# Patient Record
Sex: FEMALE | Race: AMERICAN INDIAN OR ALASKA NATIVE
[De-identification: names, ages, dates, MRNs, and addresses within clinical notes are randomized per-mention and may not be internally consistent; named-entity substitution may affect disease eponyms.]

---

## 2018-05-01 ENCOUNTER — HOSPITAL ENCOUNTER (OUTPATIENT)
Dept: HOSPITAL 5 - TRG | Age: 23
Discharge: HOME | End: 2018-05-01
Attending: OBSTETRICS & GYNECOLOGY
Payer: COMMERCIAL

## 2018-05-01 VITALS — DIASTOLIC BLOOD PRESSURE: 60 MMHG | SYSTOLIC BLOOD PRESSURE: 108 MMHG

## 2018-05-01 DIAGNOSIS — Z3A.33: ICD-10-CM

## 2018-05-01 DIAGNOSIS — O47.03: Primary | ICD-10-CM

## 2018-05-01 LAB
BILIRUB UR QL STRIP: (no result)
BLOOD UR QL VISUAL: (no result)
MUCOUS THREADS #/AREA URNS HPF: (no result) /HPF
PH UR STRIP: 7 [PH] (ref 5–7)
PROT UR STRIP-MCNC: (no result) MG/DL
RBC #/AREA URNS HPF: 2 /HPF (ref 0–6)
UROBILINOGEN UR-MCNC: < 2 MG/DL (ref ?–2)
WBC #/AREA URNS HPF: 1 /HPF (ref 0–6)

## 2018-05-01 PROCEDURE — 81001 URINALYSIS AUTO W/SCOPE: CPT

## 2018-05-01 PROCEDURE — 76815 OB US LIMITED FETUS(S): CPT

## 2018-05-01 PROCEDURE — 76819 FETAL BIOPHYS PROFIL W/O NST: CPT

## 2018-05-01 PROCEDURE — 59025 FETAL NON-STRESS TEST: CPT

## 2018-05-02 NOTE — ULTRASOUND REPORT
FINAL REPORT



EXAM:  US OB FETAL BPP WO NON-STRESS



HISTORY:  decreased fetal movement;  STEPHANIE 



COMPARISON:  Limited Ob ultrasound from the same date. 



TECHNIQUE:  Several real-time grayscale and color Doppler images

were obtained. 



FINDINGS:  

Normal fetal breathing movements, fetal movements, fetal

posterior tone and qualitative amniotic fluid volume. 



Fetal heart rate 130 beats per minute. Largest pocket of amniotic

fluid 6.0 centimeters. 



IMPRESSION:  

Biophysical profile score 8/8.

## 2018-05-02 NOTE — ULTRASOUND REPORT
FINAL REPORT



EXAM:  US OB LIMITED



HISTORY:  decreased fetal movement;  STEPHANIE 



COMPARISON:  None available. 



TECHNIQUE:  Several real-time grayscale and color Doppler images

were obtained. 



FINDINGS:  

Limited exam performed. Single live IUP. Fetal heart rate 130

beats per minute. STEPHANIE 19.5 centimeters within normal limits.

Placenta location fundal and posterior. Fetal presentation

breech. 



IMPRESSION:  

Limited exam demonstrating fetal presentation. Fetal presentation

is breech. Normal STEPHANIE.

## 2018-05-10 ENCOUNTER — HOSPITAL ENCOUNTER (OUTPATIENT)
Dept: HOSPITAL 5 - TRG | Age: 23
Discharge: HOME | End: 2018-05-10
Attending: OBSTETRICS & GYNECOLOGY
Payer: COMMERCIAL

## 2018-05-10 VITALS — SYSTOLIC BLOOD PRESSURE: 102 MMHG | DIASTOLIC BLOOD PRESSURE: 57 MMHG

## 2018-05-10 DIAGNOSIS — Z3A.34: ICD-10-CM

## 2018-05-10 DIAGNOSIS — Z34.93: Primary | ICD-10-CM

## 2018-05-10 LAB
BACTERIA #/AREA URNS HPF: (no result) /HPF
BILIRUB UR QL STRIP: (no result)
BLOOD UR QL VISUAL: (no result)
HYALINE CASTS #/AREA URNS LPF: 1 /LPF
MUCOUS THREADS #/AREA URNS HPF: (no result) /HPF
PH UR STRIP: 7 [PH] (ref 5–7)
PROT UR STRIP-MCNC: (no result) MG/DL
RBC #/AREA URNS HPF: 2 /HPF (ref 0–6)
UROBILINOGEN UR-MCNC: < 2 MG/DL (ref ?–2)
WBC #/AREA URNS HPF: 37 /HPF (ref 0–6)

## 2018-05-10 PROCEDURE — 81001 URINALYSIS AUTO W/SCOPE: CPT

## 2018-05-10 PROCEDURE — 59025 FETAL NON-STRESS TEST: CPT

## 2018-06-09 ENCOUNTER — HOSPITAL ENCOUNTER (OUTPATIENT)
Dept: HOSPITAL 5 - TRG | Age: 23
Discharge: HOME | End: 2018-06-09
Attending: OBSTETRICS & GYNECOLOGY
Payer: COMMERCIAL

## 2018-06-09 VITALS — SYSTOLIC BLOOD PRESSURE: 115 MMHG | DIASTOLIC BLOOD PRESSURE: 69 MMHG

## 2018-06-09 DIAGNOSIS — O47.1: Primary | ICD-10-CM

## 2018-06-09 DIAGNOSIS — Z3A.39: ICD-10-CM

## 2018-06-09 PROCEDURE — 76815 OB US LIMITED FETUS(S): CPT

## 2018-06-09 PROCEDURE — 76819 FETAL BIOPHYS PROFIL W/O NST: CPT

## 2018-06-09 NOTE — ULTRASOUND REPORT
FINAL REPORT



EXAM:  US OB LIMITED



HISTORY:  fetal well being;  STEPHANIE 



TECHNIQUE:  Directed sonography of the pelvis.



PRIORS:  1 May 2018.



FINDINGS:  

Limited examination performed. Single, live IUP. Fetal heart rate

142 beats per minute. Amniotic fluid index 15.9 cm. 



IMPRESSION:  

1. Please see above.

## 2018-06-09 NOTE — EVENT NOTE
Date: 06/09/18





Pt seen and evaluated. NOT in active labor. STEPHANIE normal as well as bpp of 10/10. 

No s/sx of SROM at this time.Tacycardia responds to po hydration. she does not 

have SOB at the time of my exam.She is to be d/c home and advised to f/u next 

week as she has missed last several apts. She expressed understanding and 

questions were addressed and aswered. Vistaril was offered and pt agrees to 

take. Will hold until pt transportation has arrived. cx: 1/long/post/-3

## 2018-06-16 ENCOUNTER — HOSPITAL ENCOUNTER (INPATIENT)
Dept: HOSPITAL 5 - TRG | Age: 23
LOS: 2 days | Discharge: HOME | End: 2018-06-18
Attending: OBSTETRICS & GYNECOLOGY | Admitting: OBSTETRICS & GYNECOLOGY
Payer: COMMERCIAL

## 2018-06-16 DIAGNOSIS — D64.9: ICD-10-CM

## 2018-06-16 DIAGNOSIS — Z3A.38: ICD-10-CM

## 2018-06-16 PROCEDURE — 85014 HEMATOCRIT: CPT

## 2018-06-16 PROCEDURE — 86592 SYPHILIS TEST NON-TREP QUAL: CPT

## 2018-06-16 PROCEDURE — 86901 BLOOD TYPING SEROLOGIC RH(D): CPT

## 2018-06-16 PROCEDURE — 36415 COLL VENOUS BLD VENIPUNCTURE: CPT

## 2018-06-16 PROCEDURE — 86900 BLOOD TYPING SEROLOGIC ABO: CPT

## 2018-06-16 PROCEDURE — 99211 OFF/OP EST MAY X REQ PHY/QHP: CPT

## 2018-06-16 PROCEDURE — 85018 HEMOGLOBIN: CPT

## 2018-06-16 PROCEDURE — 87806 HIV AG W/HIV1&2 ANTB W/OPTIC: CPT

## 2018-06-16 PROCEDURE — 85027 COMPLETE CBC AUTOMATED: CPT

## 2018-06-16 PROCEDURE — G0463 HOSPITAL OUTPT CLINIC VISIT: HCPCS

## 2018-06-16 PROCEDURE — 86850 RBC ANTIBODY SCREEN: CPT

## 2018-06-16 NOTE — HISTORY AND PHYSICAL REPORT
History of Present Illness


Date of examination: 18


Date of admission: 


18 22:24





Chief complaint: 





SROM at 2030


History of present illness: 








Past Pregnancy History 


   :      2


   Term Births:      1


   Living Children:   1


   Para:      1





Pregnancy # 1


   Delivery date:     2014


   Weeks Gestation:   ft


   Delivery type:     


   Anesthesia type:     epidural


   Infant Sex:      Female


   Birth weight:      6-5


   Comments:      no complications








Past Medical History:


   Negative Past Medical History





Past Surgical History:


   Negative Past Surgical History





Past Medical History 


Surgery (Non-gyn): Negative Past Surgical History





Abnormal PAP: negative


[place] Exposure: negative


Infertility: negative


Uterine Anomaly: negative


Uterine Surgery (not C/S): negative


Other Gynecologic Problems: negative


Medical History Comments: neg





Family Hx: DM


HTN





Social Hx: single


no e/t/d





Infection History 


Hx of STD: chlamydia


Varicella/Chicken Pox Status: Unknown


Infection History Comments: not sure if had vaccne for chicken pox or not





Genetic History 


 Congenital Heart Defect:


    Mom: no  Dad: no


Canavan Disease:


    Mom: no  Dad: no


Thalassemia


    Mom: no  Dad: no


Neural Tube Defect


    Mom: no  Dad: no


Down's Syndrome


    Mom: no  Dad: no


Damion-Sachs


    Mom: no  Dad: no


Sickle Cell Disease/Trait


    Mom: no  Dad: no


Hemophilia


    Mom: no  Dad: no


Muscular Dystrophy


    Mom: no  Dad: no


Cystic Fibrosis


    Mom: no  Dad: no


[place] Chorea


    Mom: no  Dad: no


Mental Retardation


    Mom: no  Dad: no


Fragile X


    Mom: no  Dad: no


Other Genetic/Chromosomal Disorder


    Mom: no  Dad: no


Child w/other birth defect


    Mom: no  Dad: no


Comments/Counseling: autism- twin brother and foc brother





Enviromental Exposures 


Xray Exposure: no


Medication, drug, or alcohol use since LMP: no


Chemical/Other Exposure: no


Exposure to Cat Liter: no


Hx of Parvovirus (Fifth Disease): no


Occupational Exposure to Children: none


Active Medications (reviewed today):


None





Current Allergies (reviewed today):


No known allergies








Past History





- Obstetrical History


Expected Date of Delivery: 18


Actual Gestation: 38 Week(s) 6 Day(s) 


: 2





Medications and Allergies


 Allergies











Allergy/AdvReac Type Severity Reaction Status Date / Time


 


No Known Allergies Allergy   Verified 18 15:46











 Home Medications











 Medication  Instructions  Recorded  Confirmed  Last Taken  Type


 


Multivit-Minerals/Folic Acid 200 mcg PO DAILY 18 09:00 

History





[Women's Multivitamin Gummies]    1 














Review of Systems


All systems: negative





- Vital Signs


Vital signs: 


 Vital Signs











Temp Pulse Resp BP Pulse Ox


 


 96.9 F L  111 H  18   123/70   100 


 


 18 22:54  18 22:54  18 22:54  18 22:54  18 22:54








 











Temp Pulse Resp BP Pulse Ox


 


 96.9 F L  113 H  18   123/70   100 


 


 18 22:54  18 22:54  18 22:54  18 22:54  18 22:54














- Physical Exam


Breasts: Positive: deferred


Cardiovascular: Regular rate


Lungs: Positive: Normal air movement


Abdomen: Positive: normal appearance, soft


Genitourinary (Female): Positive: normal external genitalia, normal perenium


Vulva: both: normal


Uterus: Positive: enlarged


Extremities: Positive: normal.  Negative: tenderness, edema


Deep Tendon Reflex Grade: Normal +2





- Obstetrical


FHR: category 1


Uterine Contraction Monitor Mode: External


Cervical Dilatation: 2 (+fern)


Cervical Effacement Percentage: 70


Fetal station: -2, posterior


Uterine Contraction Pattern: Irregular





Results


All other labs normal.








Assessment and Plan





- Patient Problems


(1) 38 weeks gestation of pregnancy


Current Visit: Yes   Status: Acute   





(2) Rupture of fetal membranes


Current Visit: Yes   Status: Acute   


Plan to address problem: 


Admit 


Pitocin augmentation

## 2018-06-17 LAB
HCT VFR BLD CALC: 24.3 % (ref 30.3–42.9)
HCT VFR BLD CALC: 30.4 % (ref 30.3–42.9)
HGB BLD-MCNC: 7.6 GM/DL (ref 10.1–14.3)
HGB BLD-MCNC: 9.5 GM/DL (ref 10.1–14.3)
MCH RBC QN AUTO: 22 PG (ref 28–32)
MCHC RBC AUTO-ENTMCNC: 31 % (ref 30–34)
MCV RBC AUTO: 70 FL (ref 79–97)
PLATELET # BLD: 236 K/MM3 (ref 140–440)
RBC # BLD AUTO: 4.35 M/MM3 (ref 3.65–5.03)

## 2018-06-17 PROCEDURE — 0KQM0ZZ REPAIR PERINEUM MUSCLE, OPEN APPROACH: ICD-10-PCS | Performed by: OBSTETRICS & GYNECOLOGY

## 2018-06-17 PROCEDURE — 00HU33Z INSERTION OF INFUSION DEVICE INTO SPINAL CANAL, PERCUTANEOUS APPROACH: ICD-10-PCS | Performed by: OBSTETRICS & GYNECOLOGY

## 2018-06-17 PROCEDURE — 3E0R3BZ INTRODUCTION OF ANESTHETIC AGENT INTO SPINAL CANAL, PERCUTANEOUS APPROACH: ICD-10-PCS | Performed by: OBSTETRICS & GYNECOLOGY

## 2018-06-17 RX ADMIN — IBUPROFEN SCH: 600 TABLET, FILM COATED ORAL at 23:10

## 2018-06-17 RX ADMIN — IBUPROFEN SCH: 600 TABLET, FILM COATED ORAL at 17:59

## 2018-06-17 RX ADMIN — IBUPROFEN SCH: 600 TABLET, FILM COATED ORAL at 12:35

## 2018-06-17 NOTE — ANESTHESIA CONSULTATION
Anesthesia Consult and Med Hx


Date of service: 06/17/18





- Airway


Anesthetic Teeth Evaluation: Good


ROM Head & Neck: Adequate


Mental/Hyoid Distance: Adequate


Mallampati Class: Class II


Intubation Access Assessment: Probably Good





- Pulmonary Exam


CTA: Yes





- Cardiac Exam


Cardiac Exam: RRR





- Pre-Operative Health Status


ASA Pre-Surgery Classification: ASA2, Emergency


Proposed Anesthetic Plan: Epidural, Spinal





- Pulmonary


Hx Asthma: No


COPD: No


Hx Pneumonia: No





- Cardiovascular System


Hx Hypertension: No





- Central Nervous System


Hx Seizures: No


Hx Psychiatric Problems: No





- Endocrine


Hx Renal Disease: No


Hx End Stage Renal Disease: No


Hx Hypothyroidism: No


Hx Hyperthyroidism: No





- Hematic


Hx Anemia: No


Hx Sickle Cell Disease: No





- Other Systems


Hx Alcohol Use: No

## 2018-06-17 NOTE — PROCEDURE NOTE
OB Delivery Note





- Delivery


Date of Delivery: 18


Surgeon: CYNTHIA KRUEGER


Estimated blood loss: 200cc





- Vaginal


Delivery presentation: vertex


Delivery position: OA


Intrapartum events: none


Delivery induction: none


Delivery augmentation: pitocin


Delivery monitor: external FHT, external uterine


Route of delivery: 


Delivery placenta: spontaneous (intact)


Episiotomy: none


Delivery laceration: 2nd degree


Delivery repair: vicryl (3-0, usual fashion)


Anesthesia: epidural





- Infant


  ** A


Apgar at 1 minute: 8


Apgar at 5 minutes: 9


Infant Gender: Female (6#10)

## 2018-06-18 VITALS — SYSTOLIC BLOOD PRESSURE: 116 MMHG | DIASTOLIC BLOOD PRESSURE: 62 MMHG

## 2018-06-18 RX ADMIN — IBUPROFEN SCH: 600 TABLET, FILM COATED ORAL at 05:11

## 2018-06-18 NOTE — DISCHARGE SUMMARY
Providers





- Providers


Date of Admission: 


18 22:24





Date of discharge: 18 (pt states she feels great; request d/c today)


Attending physician: 


CYNTHIA KRUEGER





 





18 08:23


Consult to Lactation Consultant [CONS] Routine 


   Reason For Exam: assistance with breastfeeding, SNS











Primary care physician: 


CYNTHIA KRUEGER








Hospitalization


Reason for admission: active labor


Delivery: 


Episiotomy: none


Laceration: none


Incision: normal


Other postpartum procedures: none


Postpartum complications: none


Discharge diagnosis: IUP at term delivered


[place] baby: female


Hospital course: 


uncomplicated vaginal delivery


postpartum course complicated by anemia; tachycardia





Pt has been OOB to shower and AM care denies dizziness no increased bleeding. 

No c/o SOB. VSS with exception of tachycardia. H&H  drop r/t blood loss 

from delivery; pt is chronically anemia. Doing well s/p vag delivery; anemia P: 

d/c today with instructions RX po iron, colace, motrin. Will consult with 

.





Condition at discharge: Good


Disposition: DC-01 TO HOME OR SELFCARE





- Discharge Diagnoses


(1) Spontaneous vaginal delivery


Status: Acute   Comment: RTO 4 weeks PP care   





(2) Anemia affecting pregnancy


Status: Acute   


Qualifiers: 


   Trimester: unspecified trimester   Qualified Code(s): O99.019 - Anemia 

complicating pregnancy, unspecified trimester   


Comment: d/c home on po iron; pt instructed to call with dizziness and or SOB   





Plan





- Discharge Medications


Prescriptions: 


Docusate Sodium [Colace] 100 mg PO BID PRN #60 capsule


 PRN Reason: Constipation


Ferrous Sulfate [Feosol 325 MG tab] 325 mg PO BID #60 tablet


Ibuprofen [Motrin 800 MG tab] 800 mg PO TID PRN #30 tablet


 PRN Reason: Pain





- Provider Discharge Summary


Activity: routine, no sex for 6 weeks, no heavy lifting 4 weeks, no strenuous 

exercise


Diet: other (increase dietary intake of iron rich foods)


Instructions: routine


Additional instructions: 


[]  Smoking cessation referral if applicable(refer to patient education folder 

for contact #)


[]  Refer to [facility]'s [facility] Booklet








Call your doctor immediately for:


* Fever > 100.5


* Heavy vaginal bleeding ( >1 pad per hour)


* Severe persistent headache


* Shortness of breath


* Reddened, hot, painful area to leg or breast


* Drainage or odor from incision.





* Keep incision clean and dry at all times and follow doctor's instructions 

regarding bathing/showering











- Follow up plan


Follow up: 


CYNTHIA KRUEGER MD [Primary Care Provider] - 18


(Congratulations!


Call [phone] to schedule your postpartum visit in 4 weeks.


Take medications as prescribed. Call with any concerns about shortness of 

breathe or dizziness.


Call with any concerns.)
